# Patient Record
Sex: FEMALE | URBAN - METROPOLITAN AREA
[De-identification: names, ages, dates, MRNs, and addresses within clinical notes are randomized per-mention and may not be internally consistent; named-entity substitution may affect disease eponyms.]

---

## 2019-04-23 ENCOUNTER — IMPORTED ENCOUNTER (OUTPATIENT)
Dept: URBAN - METROPOLITAN AREA CLINIC 50 | Facility: CLINIC | Age: 64
End: 2019-04-23

## 2019-04-29 NOTE — PATIENT DISCUSSION
VF is fairly clear today. Follow closely OD inferior nasal defect. With pt reacting to Ernst z qhs OU, I would be fine to follow off of drops for now. Pachs were thick today with good indication for glaucoma. But, disc high risk. Repeat tests in 6 months to see if any worsening.

## 2019-10-28 NOTE — PATIENT DISCUSSION
VF is remaining clear and OCT is stable with dense PPA more than likely causing thinning to RNFL OD more then OS. Will follow off of drops but disc must keep IOP low. Goal below 15mmHG.

## 2020-12-10 NOTE — PATIENT DISCUSSION
Occupational Therapy  Patient not seen in therapy today.     On hold due to medical condition    Re-attempt plan: per established plan of care         OBJECTIVE                                                                                                                        Pt with worsening respiratory status this date and not appropriate for therapies. Plan to potentially transfer off unit. Will re-attempt when medically appropriate. Thanks!           Patient defers YAG Laser Capsulotomy. --monitor.

## 2021-04-26 NOTE — PATIENT DISCUSSION
Recommended use of moisturizing drops, gentle lid massage in upper direction throughout the day. comorbidities. No surgery is recommended due to patient's advanced age and comorbidities.

## 2022-04-22 NOTE — PATIENT DISCUSSION
Discussed risks/benefits and alternatives to surgery. Mom in to visit  Patient sleeping  1l1 sitter is present       Sivakumar Ma  01/23/22 [id number]

## 2022-04-22 NOTE — PATIENT DISCUSSION
vf remains unchanged with nasal defect OS that is consistent with RNFL, seems well controlled at this point.

## 2022-11-29 NOTE — PATIENT DISCUSSION
The IOP is in the target range. [Initial] : an initial consultation for [FreeTextEntry2] : confirmation of pregnancy

## 2025-08-25 ENCOUNTER — PREPPED CHART (OUTPATIENT)
Age: 70
End: 2025-08-25

## 2025-08-26 ENCOUNTER — CONSULTATION/EVALUATION (OUTPATIENT)
Age: 70
End: 2025-08-26

## 2025-08-26 DIAGNOSIS — H25.13: ICD-10-CM

## 2025-08-26 DIAGNOSIS — H52.4: ICD-10-CM

## 2025-08-26 DIAGNOSIS — H43.811: ICD-10-CM

## 2025-08-26 PROCEDURE — 92015 DETERMINE REFRACTIVE STATE: CPT

## 2025-08-26 PROCEDURE — 99204 OFFICE O/P NEW MOD 45 MIN: CPT

## 2025-08-26 PROCEDURE — 92134 CPTRZ OPH DX IMG PST SGM RTA: CPT
